# Patient Record
Sex: MALE | Race: WHITE | Employment: UNEMPLOYED | URBAN - METROPOLITAN AREA
[De-identification: names, ages, dates, MRNs, and addresses within clinical notes are randomized per-mention and may not be internally consistent; named-entity substitution may affect disease eponyms.]

---
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
## 2023-04-25 NOTE — PROGRESS NOTES
04/12/19 2145   Behavioral Health   Hallucinations denies / not responding to hallucinations   Thinking intact   Orientation person: oriented;place: oriented;date: oriented;time: oriented   Memory baseline memory   Insight poor   Judgement impaired   Eye Contact at examiner   Affect sad;full range affect   Mood depressed   Physical Appearance/Attire attire appropriate to age and situation   Hygiene well groomed   Suicidality thoughts only   1. Wish to be Dead Yes   Wish to be Dead Description   (no plan)   2. Non-Specific Active Suicidal Thoughts  Yes   Non-Specific Active Suicidal Thought Description negative self-talk   3. Active Sucidal Ideation with any Methods (Not Plan) Without Intent to Act  No   4. Active Suicidal Ideation with Some Intent to Act, Without Specific Plan  No   5. Active Suicidal Ideation with Specific Plan and Intent  No   Duration (Lifetime) NA   Change in Protective Factors? No   Enviromental Risk Factors None   Self Injury thoughts only   Elopement   (none stated or observed)   Activity other (see comment)  (active in groups and milieu )   Speech clear;coherent   Medication Sensitivity no stated side effects;no observed side effects   Psychomotor / Gait balanced;steady   Activities of Daily Living   Hygiene/Grooming independent   Oral Hygiene independent   Dress independent   Laundry unable to complete   Room Organization independent     Patient had a okay shift.    Patient did not require seclusion/restraints to manage behavior.    Gordon Silva did participate in groups and was visible in the milieu.    Notable mental health symptoms during this shift:depressed mood  decreased energy  distractable    Patient is working on these coping/social skills: Sharing feelings  Distraction  Breathing exercises   Asking for help    Visitors during this shift included N/A.  Overall, the visit was N/A.  Significant events during the visit included N/A.    Other information about this shift: Pt said  that his shift went okay. He accomplished his goal of making in through the day. Writer asked him what he had been doing to help distract himself. Pt said that he had been reading more, and that had been working well. Pt said that his suicidal thoughts were not too bad as of right now. He said that it consisted of negative self talk and statements about himself. He said that he was experiencing urges, but he said that he was not going to act on them. Pt said that he felt safe on the unit. Pt seemed to greatly benefit from positive affirmation meditation.      right wrist fracture